# Patient Record
Sex: FEMALE | Race: WHITE
[De-identification: names, ages, dates, MRNs, and addresses within clinical notes are randomized per-mention and may not be internally consistent; named-entity substitution may affect disease eponyms.]

---

## 2017-04-08 ENCOUNTER — HOSPITAL ENCOUNTER (EMERGENCY)
Dept: HOSPITAL 25 - UCEAST | Age: 37
Discharge: HOME | End: 2017-04-08
Payer: COMMERCIAL

## 2017-04-08 VITALS — DIASTOLIC BLOOD PRESSURE: 79 MMHG | SYSTOLIC BLOOD PRESSURE: 142 MMHG

## 2017-04-08 DIAGNOSIS — R03.0: ICD-10-CM

## 2017-04-08 DIAGNOSIS — Z87.891: ICD-10-CM

## 2017-04-08 DIAGNOSIS — O23.41: Primary | ICD-10-CM

## 2017-04-08 DIAGNOSIS — O26.891: ICD-10-CM

## 2017-04-08 DIAGNOSIS — Z79.82: ICD-10-CM

## 2017-04-08 DIAGNOSIS — Z3A.11: ICD-10-CM

## 2017-04-08 PROCEDURE — 84702 CHORIONIC GONADOTROPIN TEST: CPT

## 2017-04-08 PROCEDURE — 87086 URINE CULTURE/COLONY COUNT: CPT

## 2017-04-08 PROCEDURE — 99212 OFFICE O/P EST SF 10 MIN: CPT

## 2017-04-08 PROCEDURE — G0463 HOSPITAL OUTPT CLINIC VISIT: HCPCS

## 2017-04-08 PROCEDURE — 81003 URINALYSIS AUTO W/O SCOPE: CPT

## 2017-04-08 NOTE — UC
Complaint Female HPI





- HPI Summary


HPI Summary: 





The patient comes in today for:





1.   Pressure with urination:  





Onset:  1-2 days.


Palliative/provocative:  Nothing makes it better or worse. 


Quality:  Pressure.


Region:  Lower middle abdomen.


Severity:  Usually only with urination and having a full bladder. 


Time: Comes and goes.


Associated symptoms:


   Fevers: None.


   Last UTI: September.


   Pregnancy: 11 weeks pregnant.   (LMP: January 21st). 


   Urinary urgency: None.


   Urinary frequency: Present, but she states that she has had this for 12 

weeks. 








*





- History Of Current Complaint


Chief Complaint: UCGU


Stated Complaint: POSSIBLE UTI


Time Seen by Provider: 04/08/17 09:53


Hx Obtained From: Patient


Hx Last Menstrual Period: 1-21-17





- Allergies/Home Medications


Allergies/Adverse Reactions: 


 Allergies











Allergy/AdvReac Type Severity Reaction Status Date / Time


 


No Known Allergies Allergy   Verified 04/08/17 09:24











Home Medications: 


 Home Medications





Aspirin [Aspirin 81 MG TAB] 81 mg PO 04/08/17 [History]


Labetalol TAB* [Trandate TAB*] 200 mg PO BID 04/08/17 [History Confirmed 04/08/ 17]


Prenat Vit W/ Iron Carbonyl-Fe [Ob Complete/Dha 30-10-1-200 mg] 1 cap PO 04/08/ 17 [History]











PMH/Surg Hx/FS Hx/Imm Hx


Previously Healthy: No


Endocrine History Of: 


   Denies: Diabetes, Thyroid Disease, Hyperthyroidism, Hypothyroidism, 

Dyslipidemia


Cardiovascular History Of: Reports: Hypertension


   Denies: Cardiac Disorders, Pacemaker/ICD, Myocardial Infarction, Congestive 

Heart Failure, Atrial Fibrillation, Deep Vein Thrombosis, Bleeding Disorders


Respiratory History Of: 


   Denies: COPD, Asthma, Bronchitis, Pneumonia, Pulmonary Embolism


GI/ History Of: 


   Denies: Gastroesophageal Reflux, Ulcer, Gastrointestinal Bleed, Gall Bladder 

Disease, Kidney Stones, Diverticulitis, Renal Disease, Urosepsis


Neurological History Of: 


   Denies: TIA, CVA, Dementia, Seizures, Migraine


Psychological History Of: 


   Denies: Anxiety, Depression, Bipolar Disorder, Schizophrenia, Post Traumatic 

Stress Disorder


Cancer History Of: 


   Denies: Lung Cancer, Colorectal Cancer, Breast Cancer, Prostate Cancer, 

Cervical Cancer


Other History Of: Anticoagulant Therapy


   Negative For: HIV, Hepatitis B - 81 mg aspirin daily., Hepatitis C





- Surgical History


Surgical History: Yes


Surgery Procedure, Year, and Place: [facility]





- Family History


Known Family History: Positive: Hypertension, Diabetes


   Negative: Cardiac Disease





- Social History


Occupation: Employed Full-time


Alcohol Use: None


Substance Use Type: None


Smoking Status (MU): Former Smoker





- Immunization History


Most Recent Influenza Vaccination: Fall 2015


Most Recent Tetanus Shot: within 10 years


Most Recent Pneumonia Vaccination: never





Review of Systems


Constitutional: Negative


Skin: Negative


Eyes: Negative


ENT: Negative


Respiratory: Negative


Cardiovascular: Negative


Gastrointestinal: Negative


Genitourinary: Dysuria, Frequency


All Other Systems Reviewed And Are Negative: Yes





Physical Exam


Triage Information Reviewed: Yes


Appearance: Well-Appearing, No Pain Distress, Well-Nourished


Vital Signs: 


 Initial Vital Signs











Temp  97.7 F   04/08/17 09:18


 


Pulse  73   04/08/17 09:18


 


Resp  14   04/08/17 09:18


 


BP  142/79   04/08/17 09:18


 


Pulse Ox  100   04/08/17 09:18











Vital Signs Reviewed: Yes


Eyes: Positive: Conjunctiva Clear.  Negative: Discharge


ENT: Positive: Hearing grossly normal.  Negative: Pharyngeal erythema, Nasal 

congestion, Nasal drainage, TM bulging, TM dull, Tonsillar swelling, Tonsillar 

exudate


Dental: Negative: Gross Decay/Caries @, Dental Fracture @


Neck: Positive: Supple, Nontender, No Lymphadenopathy.  Negative: Nuchal 

Rigidity


Respiratory: Positive: Lungs clear, No respiratory distress, No accessory 

muscle use.  Negative: Crackles, Rhonchi, Wheezing


Cardiovascular: Positive: RRR, No Murmur


Abdomen Description: Positive: Nontender, No Organomegaly, Soft.  Negative: CVA 

Tenderness (R), CVA Tenderness (L), Distended, Guarding


Musculoskeletal: Positive: Strength Intact, ROM Intact


Neurological: Positive: Alert, Muscle Tone Normal


Psychological: Positive: Age Appropriate Behavior, Consolable


Skin: Negative: rashes, breakdown





Diagnostics





- Laboratory


Diagnostic Studies Completed/Ordered: Urine screen:  specific gravity: 1.020.  

WBC: 2+.  Nitrite: (-).  Blood: (-).  Glucose: (-)





 Complaint Female Dx





- Differential Dx/Diagnosis


Differential Diagnosis/HQI/PQRI: Renal Colic, Urinary Tract Infection


Provider Diagnoses: intrauterine pregnancy.  UTI.  High blood pressure.





Discharge





- Discharge Plan


Condition: Stable


Disposition: HOME


Patient Education Materials:  Urinary Tract Infection in Women (ED)


Referrals: 


Crystal Anderson NP [Primary Care Provider] - 1 Week (Please see your primary 

care provider or OB/GYN provider  next week to see how well you are doing and 

to check your blood pressure.)

## 2017-10-23 ENCOUNTER — HOSPITAL ENCOUNTER (INPATIENT)
Dept: HOSPITAL 25 - MCHOBOUT | Age: 37
LOS: 3 days | Discharge: HOME | DRG: 560 | End: 2017-10-26
Attending: OBSTETRICS & GYNECOLOGY | Admitting: OBSTETRICS & GYNECOLOGY
Payer: COMMERCIAL

## 2017-10-23 DIAGNOSIS — Z3A.39: ICD-10-CM

## 2017-10-23 LAB
HCT VFR BLD AUTO: 35 % (ref 35–47)
HGB BLD-MCNC: 11.7 G/DL (ref 12–16)
MCH RBC QN AUTO: 30 PG (ref 27–31)
MCHC RBC AUTO-ENTMCNC: 33 G/DL (ref 31–36)
MCV RBC AUTO: 90 FL (ref 80–97)
RBC # BLD AUTO: 3.91 10^6/UL (ref 4–5.4)
WBC # BLD AUTO: 9.1 10^3/UL (ref 3.5–10.8)

## 2017-10-23 PROCEDURE — 86850 RBC ANTIBODY SCREEN: CPT

## 2017-10-23 PROCEDURE — 87086 URINE CULTURE/COLONY COUNT: CPT

## 2017-10-23 PROCEDURE — 36415 COLL VENOUS BLD VENIPUNCTURE: CPT

## 2017-10-23 PROCEDURE — 81015 MICROSCOPIC EXAM OF URINE: CPT

## 2017-10-23 PROCEDURE — 86900 BLOOD TYPING SEROLOGIC ABO: CPT

## 2017-10-23 PROCEDURE — 85025 COMPLETE CBC W/AUTO DIFF WBC: CPT

## 2017-10-23 PROCEDURE — 81003 URINALYSIS AUTO W/O SCOPE: CPT

## 2017-10-23 PROCEDURE — 86901 BLOOD TYPING SEROLOGIC RH(D): CPT

## 2017-10-23 PROCEDURE — 88307 TISSUE EXAM BY PATHOLOGIST: CPT

## 2017-10-23 PROCEDURE — 10907ZC DRAINAGE OF AMNIOTIC FLUID, THERAPEUTIC FROM PRODUCTS OF CONCEPTION, VIA NATURAL OR ARTIFICIAL OPENING: ICD-10-PCS | Performed by: OBSTETRICS & GYNECOLOGY

## 2017-10-23 PROCEDURE — 3E0P3VZ INTRODUCTION OF HORMONE INTO FEMALE REPRODUCTIVE, PERCUTANEOUS APPROACH: ICD-10-PCS | Performed by: OBSTETRICS & GYNECOLOGY

## 2017-10-24 PROCEDURE — 4A1H7CZ MONITORING OF PRODUCTS OF CONCEPTION, CARDIAC RATE, VIA NATURAL OR ARTIFICIAL OPENING: ICD-10-PCS | Performed by: OBSTETRICS & GYNECOLOGY

## 2017-10-24 PROCEDURE — 10H07YZ INSERTION OF OTHER DEVICE INTO PRODUCTS OF CONCEPTION, VIA NATURAL OR ARTIFICIAL OPENING: ICD-10-PCS | Performed by: OBSTETRICS & GYNECOLOGY

## 2017-10-24 RX ADMIN — Medication SCH: at 14:58

## 2017-10-24 RX ADMIN — LABETALOL HCL SCH MG: 200 TABLET, FILM COATED ORAL at 08:37

## 2017-10-24 RX ADMIN — IBUPROFEN PRN MG: 600 TABLET, FILM COATED ORAL at 10:53

## 2017-10-24 RX ADMIN — DOCUSATE SODIUM SCH MG: 100 CAPSULE, LIQUID FILLED ORAL at 20:29

## 2017-10-24 RX ADMIN — DOCUSATE SODIUM SCH MG: 100 CAPSULE, LIQUID FILLED ORAL at 14:58

## 2017-10-24 RX ADMIN — LABETALOL HCL SCH MG: 200 TABLET, FILM COATED ORAL at 20:30

## 2017-10-24 RX ADMIN — IBUPROFEN PRN MG: 600 TABLET, FILM COATED ORAL at 20:30

## 2017-10-25 LAB
HCT VFR BLD AUTO: 27 % (ref 35–47)
HGB BLD-MCNC: 9 G/DL (ref 12–16)
MCH RBC QN AUTO: 30 PG (ref 27–31)
MCHC RBC AUTO-ENTMCNC: 34 G/DL (ref 31–36)
MCV RBC AUTO: 90 FL (ref 80–97)
RBC # BLD AUTO: 2.96 10^6/UL (ref 4–5.4)
WBC # BLD AUTO: 15.5 10^3/UL (ref 3.5–10.8)

## 2017-10-25 RX ADMIN — DOCUSATE SODIUM SCH MG: 100 CAPSULE, LIQUID FILLED ORAL at 14:17

## 2017-10-25 RX ADMIN — LABETALOL HCL SCH MG: 200 TABLET, FILM COATED ORAL at 07:33

## 2017-10-25 RX ADMIN — IBUPROFEN PRN MG: 600 TABLET, FILM COATED ORAL at 22:59

## 2017-10-25 RX ADMIN — Medication SCH MG: at 09:31

## 2017-10-25 RX ADMIN — DOCUSATE SODIUM SCH MG: 100 CAPSULE, LIQUID FILLED ORAL at 21:38

## 2017-10-25 RX ADMIN — Medication SCH: at 09:31

## 2017-10-25 RX ADMIN — Medication SCH MG: at 21:38

## 2017-10-25 RX ADMIN — DOCUSATE SODIUM SCH MG: 100 CAPSULE, LIQUID FILLED ORAL at 07:33

## 2017-10-25 RX ADMIN — LABETALOL HCL SCH MG: 200 TABLET, FILM COATED ORAL at 21:38

## 2017-10-25 RX ADMIN — IBUPROFEN PRN MG: 600 TABLET, FILM COATED ORAL at 16:24

## 2017-10-25 RX ADMIN — IBUPROFEN PRN MG: 600 TABLET, FILM COATED ORAL at 04:26

## 2017-10-26 VITALS — DIASTOLIC BLOOD PRESSURE: 77 MMHG | SYSTOLIC BLOOD PRESSURE: 128 MMHG

## 2017-10-26 RX ADMIN — LABETALOL HCL SCH MG: 200 TABLET, FILM COATED ORAL at 08:33

## 2017-10-26 RX ADMIN — DOCUSATE SODIUM SCH MG: 100 CAPSULE, LIQUID FILLED ORAL at 13:30

## 2017-10-26 RX ADMIN — Medication SCH MG: at 08:33

## 2017-10-26 RX ADMIN — Medication SCH: at 14:42

## 2017-10-26 RX ADMIN — DOCUSATE SODIUM SCH MG: 100 CAPSULE, LIQUID FILLED ORAL at 08:33

## 2018-06-15 ENCOUNTER — HOSPITAL ENCOUNTER (EMERGENCY)
Dept: HOSPITAL 25 - UCEAST | Age: 38
Discharge: HOME | End: 2018-06-15
Payer: COMMERCIAL

## 2018-06-15 VITALS — SYSTOLIC BLOOD PRESSURE: 147 MMHG | DIASTOLIC BLOOD PRESSURE: 103 MMHG
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
## 2018-06-15 DIAGNOSIS — Z82.49: ICD-10-CM
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
## 2018-06-15 DIAGNOSIS — S61.031A: Primary | ICD-10-CM

## 2018-06-15 DIAGNOSIS — Z83.3: ICD-10-CM

## 2018-06-15 DIAGNOSIS — Z79.01: ICD-10-CM

## 2018-06-15 DIAGNOSIS — Z87.891: ICD-10-CM
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
## 2018-06-15 DIAGNOSIS — R03.0: ICD-10-CM

## 2018-06-15 PROCEDURE — G0463 HOSPITAL OUTPT CLINIC VISIT: HCPCS

## 2018-06-15 PROCEDURE — 99213 OFFICE O/P EST LOW 20 MIN: CPT

## 2018-06-15 NOTE — UC
Sheldon ZAVALA Jade, scribed for Alejo Tomlinson MD on 06/15/18 at 2114 .





Bite Injury/Animal HPI





- HPI Summary


HPI Summary: 


Pt is a 37 y/o female who presents to [facility] s/p dog bite. Pt states her dog bit 

her right thumb about two hours ago. She states her finger is bleeding and 

swollen. Pt denies cleaning the wound. She is currently breast feeding. Pain 

intensity is a 4/10. 








- History of Current Complaint


Chief Complaint: UCBiteInjury


Stated Complaint: DOG BITE ON HAND


Time Seen by Provider: 06/15/18 21:06


Hx Obtained From: Patient


Hx Last Menstrual Period: mirena


Severity Initially: Moderate


Pain Intensity: 4


Pain Scale Used: 0-10 Numeric


Onset/Duration: Sudden Onset, Still Present


Type of Bite: Pet - Dog


Character: Puncture, Abrasion/Laceration


Associated Signs And Symptoms: Positive: Swelling





- Allergies/Home Medications


Allergies/Adverse Reactions: 


 Allergies











Allergy/AdvReac Type Severity Reaction Status Date / Time


 


No Known Allergies Allergy   Verified 06/15/18 20:58











Home Medications: 


 Home Medications





Labetalol TAB* [Trandate TAB*]  06/15/18 [History]


Levonorgestrel (Iud) [Mirena IUD]  06/15/18 [History]











PMH/Surg Hx/FS Hx/Imm Hx


Endocrine History: Other - NEGATIVE: diabetes


Other Endocrine History: .


Cardiovascular History: Other - NEGATIVE: cardiac disease


Other Cardiovascular History: .


Other History Of: Anticoagulant Therapy


   Negative For: HIV, Hepatitis B - 81 mg aspirin daily., Hepatitis C





- Surgical History


Surgical History: Yes


Surgery Procedure, Year, and Place: f[facility]ix





- Family History


Known Family History: Positive: Hypertension, Diabetes


   Negative: Cardiac Disease





- Social History


Alcohol Use: None


Substance Use Type: None


Smoking Status (MU): Former Smoker





- Immunization History


Most Recent Influenza Vaccination: Fall 2015


Most Recent Tetanus Shot: within 10 years


Most Recent Pneumonia Vaccination: never





Review of Systems


Skin: Other - Dog bite on base of right thumb


Neurovascular: Other - NEGATIVE: decreased sensation


All Other Systems Reviewed And Are Negative: Yes





Physical Exam





- Summary


Physical Exam Summary: 


General: well-appearing, no pain distress


Skin: warm, color reflects adequate perfusion, dry. 4 mm puncture wound over 

radial side of base of right thumb, not actively bleeding. 1.4 cm partial 

thickness laceration over ulnar aspect of base of right thumb, not actively 

bleeding.


Head: normal


Eyes: EOMI, NOE


ENT: normal


Neck: supple, nontender


Respiratory: CTA, breath sounds present


Cardiovascular: RRR


Abdomen: soft, nontender


Bowel: present


Musculoskeletal: normal, strength/ROM intact of right thumb.


Neurological: sensory/motor intact of right thumb, A&O x3


Psychological: affect/mood appropriate 





Triage Information Reviewed: Yes


Vital Signs: 


 Initial Vital Signs











Temp  99.2 F   06/15/18 20:59


 


Pulse  93   06/15/18 20:59


 


Resp  16   06/15/18 20:59


 


BP  147/103   06/15/18 20:59


 


Pulse Ox  100   06/15/18 20:59











Vital Signs Reviewed: Yes





Diagnostics





- Radiology


  ** Thumb XR


Xray Interpretation: No Acute Changes - NO FRACTURE OR RADIOPAQUE FOREIGN BODY 

IS SEEN.  physician reviewed radiology report.


Radiology Interpretation Completed By: Radiologist





Bite Injury Course/Dx





- Course


Course Of Treatment: THUMB FROM, NO OBVIOUS LIGAMENTOUS INJURY ON EXAM.  WOUNDS 

CLEANED IN CLINIC.  STARTED ON AUGMENTIN.  PATIENT REPORTS UTD WITH TETANUS.  

DISCUSSED LOOSE SUTURE CLOSURE OF THE LARGER OF THE TWO DOG BITE WOUNDS (THE 

SMALLER IS A PUNCTURE WOUND); THE PATIENT DECLINED.  F/U HANDS.  BP noted and 

advised to follow up with PCP.





- Differential Dx/Diagnosis


Provider Diagnoses: DOG BITE RIGHT HAND.  Elevated BP without dx of HTN.





Discharge





- Sign-Out/Discharge


Documenting (check all that apply): Discharge/Admit/Transfer - Discharge





- Discharge Plan


Condition: Stable


Disposition: HOME


Prescriptions: 


Amoxicillin/Clavulanate TAB* [Augmentin *] 875 mg PO BID #19 tab


Patient Education Materials:  Animal Bite (ED)


Referrals: 


Crystal Anderson NP [Primary Care Provider] - 


Yvonne Raphael MD [Medical Doctor] - 


Additional Instructions: 


FOLLOW UP WITH DR RAPHAEL, ORTHOPEDICS.


GET RECHECKED FOR ANY WORSENING OF YOUR CONDITION; SIGNS OF INFECTION OR 

QUESTIONS OR CONCERNS.


Your blood pressure was elevated during todays visit; please follow up with 

your primary care provider within a week for further evaluation





- Billing Disposition and Condition


Condition: STABLE


Disposition: Home





The documentation as recorded by the Sheldon gonzales Jade accurately reflects the 

service I personally performed and the decisions made by me, Alejo Tomlinson MD.

## 2018-06-15 NOTE — RAD
INDICATION:  Dog bite right thumb.



TECHNIQUE: 3 views of the right thumb were obtained.



FINDINGS:  The bones are normal alignment. No fracture or radiopaque foreign body is seen.

 Joint spaces appear maintained.



IMPRESSION:  NO FRACTURE OR RADIOPAQUE FOREIGN BODY IS SEEN.

## 2019-11-17 ENCOUNTER — HOSPITAL ENCOUNTER (EMERGENCY)
Dept: HOSPITAL 25 - UCEAST | Age: 39
Discharge: HOME | End: 2019-11-17
Payer: COMMERCIAL

## 2019-11-17 VITALS — DIASTOLIC BLOOD PRESSURE: 114 MMHG | SYSTOLIC BLOOD PRESSURE: 155 MMHG

## 2019-11-17 DIAGNOSIS — J02.0: Primary | ICD-10-CM

## 2019-11-17 DIAGNOSIS — I10: ICD-10-CM

## 2019-11-17 DIAGNOSIS — Z87.891: ICD-10-CM

## 2019-11-17 PROCEDURE — 99212 OFFICE O/P EST SF 10 MIN: CPT

## 2019-11-17 PROCEDURE — G0463 HOSPITAL OUTPT CLINIC VISIT: HCPCS

## 2019-11-17 PROCEDURE — 87651 STREP A DNA AMP PROBE: CPT

## 2019-11-17 NOTE — UC
Throat Pain/Nasal Doug HPI





- HPI Summary


HPI Summary: 





2 DAYS OF SORE THROAT AND PAIN WITH SWALLOWING.  NO FEVER, COUGH OR CONGESTION.

  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





- History of Current Complaint


Chief Complaint: UCGeneralIllness


Stated Complaint: THROAT PAIN


Time Seen by Provider: 11/17/19 08:48


Hx Obtained From: Patient


Hx Last Menstrual Period: 11/10/19


Onset/Duration: Gradual Onset, Lasting Days, Still Present


Severity: Moderate


Pain Intensity: 0


Pain Scale Used: 0-10 Numeric


Cough: None


Associated Signs & Symptoms: Positive: Negative





- Allergies/Home Medications


Allergies/Adverse Reactions: 


 Allergies











Allergy/AdvReac Type Severity Reaction Status Date / Time


 


No Known Allergies Allergy   Verified 11/17/19 08:48











Home Medications: 


 Home Medications





Omeprazole 20 mg PO DAILY 11/17/19 [History Confirmed 11/17/19]











PMH/Surg Hx/FS Hx/Imm Hx


Cardiovascular History: Hypertension


Other History Of: Anticoagulant Therapy


   Negative For: HIV, Hepatitis B - 81 mg aspirin daily., Hepatitis C





- Surgical History


Surgical History: Yes


Surgery Procedure, Year, and Place: [facility] 2006





- Family History


Known Family History: Positive: Hypertension, Diabetes


   Negative: Cardiac Disease





- Social History


Alcohol Use: None


Substance Use Type: None


Smoking Status (MU): Former Smoker


Length of Time of Smoking/Using Tobacco: 1 year





- Immunization History


Most Recent Influenza Vaccination: Fall 2015


Most Recent Tetanus Shot: within 10 years


Most Recent Pneumonia Vaccination: never





Review of Systems


All Other Systems Reviewed And Are Negative: Yes


Constitutional: Positive: Negative


ENT: Positive: Sore Throat


Respiratory: Positive: Negative


Cardiovascular: Positive: Negative


Gastrointestinal: Positive: Negative





Physical Exam


Triage Information Reviewed: Yes


Appearance: Well-Appearing, No Pain Distress, Well-Nourished


Vital Signs: 


 Initial Vital Signs











Temp  98.6 F   11/17/19 08:49


 


Pulse  96   11/17/19 08:49


 


Resp  18   11/17/19 08:49


 


BP  155/114   11/17/19 08:49


 


Pulse Ox  99   11/17/19 08:49








 Laboratory Tests











  11/17/19





  08:58


 


Group A Strep Rapid  Positive A











Vital Signs Reviewed: Yes


Eyes: Positive: Conjunctiva Clear


ENT: Positive: Hearing grossly normal, Pharyngeal erythema, TMs normal, 

Tonsillar swelling


Neck: Positive: Supple, No Lymphadenopathy, Tenderness @


Respiratory Exam: Normal


Cardiovascular Exam: Normal


Abdomen Description: Positive: Soft


Musculoskeletal: Positive: No Edema


Neurological: Positive: Alert


Psychological: Positive: Age Appropriate Behavior


Skin: Negative: Rashes





Throat Pain/Nasal Course/Dx





- Differential Dx/Diagnosis


Provider Diagnosis: 


 Strep pharyngitis








Discharge ED





- Sign-Out/Discharge


Documenting (check all that apply): Patient Departure


All imaging exams completed and their final reports reviewed: No Studies





- Discharge Plan


Condition: Stable


Disposition: HOME


Prescriptions: 


Amoxicillin PO (*) [Amoxicillin 500 MG CAP*] 500 mg PO Q12H #20 cap


Patient Education Materials:  Strep Throat (ED)


Referrals: 


Crystal Anderson NP [Primary Care Provider] - If Needed


Additional Instructions: 


STREP POSITIVE. TAKE ANTIBIOTICS FOR THE FULL 10 DAYS.


OTC CHLORASEPTIC OR CEPACOL LOZENGES AND/OR IBUPROFEN FOR SORE THROAT AS NEEDED


ONCE SYMPTOMS RESOLVED - NEW TOOTHBRUSH


DO NOT SHARE FOOD, DRINK, UTENSILS





- Billing Disposition and Condition


Condition: STABLE


Disposition: Home